# Patient Record
Sex: MALE | Race: OTHER | HISPANIC OR LATINO
[De-identification: names, ages, dates, MRNs, and addresses within clinical notes are randomized per-mention and may not be internally consistent; named-entity substitution may affect disease eponyms.]

---

## 2021-01-07 PROBLEM — Z00.00 ENCOUNTER FOR PREVENTIVE HEALTH EXAMINATION: Status: ACTIVE | Noted: 2021-01-07

## 2021-01-11 ENCOUNTER — APPOINTMENT (OUTPATIENT)
Dept: PHYSICAL MEDICINE AND REHAB | Facility: CLINIC | Age: 46
End: 2021-01-11

## 2021-01-19 ENCOUNTER — APPOINTMENT (OUTPATIENT)
Dept: PHYSICAL MEDICINE AND REHAB | Facility: CLINIC | Age: 46
End: 2021-01-19
Payer: COMMERCIAL

## 2021-01-19 VITALS
HEIGHT: 71 IN | WEIGHT: 198 LBS | HEART RATE: 68 BPM | BODY MASS INDEX: 27.72 KG/M2 | TEMPERATURE: 97.3 F | DIASTOLIC BLOOD PRESSURE: 83 MMHG | SYSTOLIC BLOOD PRESSURE: 135 MMHG | RESPIRATION RATE: 17 BRPM

## 2021-01-19 DIAGNOSIS — R20.0 ANESTHESIA OF SKIN: ICD-10-CM

## 2021-01-19 DIAGNOSIS — M79.641 PAIN IN RIGHT HAND: ICD-10-CM

## 2021-01-19 DIAGNOSIS — Z82.61 FAMILY HISTORY OF ARTHRITIS: ICD-10-CM

## 2021-01-19 DIAGNOSIS — G89.29 PAIN IN RIGHT HAND: ICD-10-CM

## 2021-01-19 PROCEDURE — 99072 ADDL SUPL MATRL&STAF TM PHE: CPT

## 2021-01-19 PROCEDURE — 99204 OFFICE O/P NEW MOD 45 MIN: CPT

## 2021-01-19 RX ORDER — MELOXICAM 15 MG/1
15 TABLET ORAL
Qty: 30 | Refills: 0 | Status: ACTIVE | COMMUNITY
Start: 2020-12-02

## 2021-01-19 NOTE — REVIEW OF SYSTEMS
[Patient Intake Form Reviewed] : Patient intake form was reviewed [Joint Pain] : joint pain [Muscle Pain] : muscle pain [Muscle Weakness] : muscle weakness [Difficulty Walking] : no difficulty walking [Negative] : Neurological [de-identified] : numbness in right hand

## 2021-01-19 NOTE — HISTORY OF PRESENT ILLNESS
[FreeTextEntry1] : Mr. FILIPPO BURNETT is a very pleasant 45 year male who seen for evaluation of right hand numbness and tingling  that has been ongoing for a year (progressively worse over the last 5-6 months) without any specific injury or inciting event. The pain is located primarily in the right thumb and in the web space between all the fingers. Reports that the numbness is painful and describes the pain as "throbbing numbness". Reports that the numbness in the thumb is constant and it is intermittent in nature between the fingers.. The pain is rated as 5/10 during today's visit, and ranges from 5-9/10. The patient's symptoms are aggravated by using his phone, using a drill at work and at night. Reports that it is alleviated by rest and heat. Reports associated weakness in the right hand. The patient denies any bowel/bladder dysfunction. He has not tried any medications, injections or had imaging of the hand. The patient has no other complaints at this time. He is right handed and works as a . \par

## 2021-01-19 NOTE — PHYSICAL EXAM
[Normal] : Oriented to person, place, and time, insight and judgement were intact and the affect was normal [de-identified] : Normal gait, no clubbing or cyanosis of the fingernails, no joint swelling seen. Muscle strength normal except mild weakness in right ABP. Positive Tinel's on the right. Negative compression or Phalen's test [de-identified] : Decreased sensation to light touch in right thumb and in web space between fingers. Negative Bolivar's. DTR symmetric and intact.

## 2021-01-19 NOTE — ASSESSMENT
[FreeTextEntry1] : Mr. FILIPPO BURNETT is a very pleasant 45 year male who seen for evaluation of right hand numbness and tingling. Based on clinical history and exam, may be due to peripheral nerve entrapment. \par -Recommend EMG/NCS to evaluate for upper extremity peripheral nerve entrapment \par -Continue with conservative care\par -Follow up for EMG/NCS testing \par \par Information given to patient about EMG and Nerve Conduction Study Examination including  planning, differential diagnosis to rule in /rule out ,duration of the test ,precautions (if patient on blood thinner.has bleeding disorder or  pace maker device etc -still possible to undergo with care), side effects(benign-limited to short term bruising and discomfort/pain)  \par The protocol of temp checks upon arrival ,disinfection procedure of waiting room and the lab explained- reassured. \par All questions answered. \par Patient instructed to book appointment upon conclusion of appointment\par \par Information sheet ' Answers to your Questions on EMG " forwarded to patient to read prior to testing, with further information about training,background and the procedure itself .\par  I certify that > 50 % of time spent was spent on counselling and coordination of care and more than 50% face to face directly \par Time spent including review of documents /records/decision making /discussion  amounted  > 45 minutes\par

## 2021-01-25 ENCOUNTER — APPOINTMENT (OUTPATIENT)
Dept: PHYSICAL MEDICINE AND REHAB | Facility: CLINIC | Age: 46
End: 2021-01-25
Payer: COMMERCIAL

## 2021-01-25 VITALS
TEMPERATURE: 97.6 F | HEART RATE: 73 BPM | BODY MASS INDEX: 27.72 KG/M2 | HEIGHT: 71 IN | WEIGHT: 198 LBS | SYSTOLIC BLOOD PRESSURE: 141 MMHG | RESPIRATION RATE: 18 BRPM | DIASTOLIC BLOOD PRESSURE: 86 MMHG

## 2021-01-25 DIAGNOSIS — G56.03 CARPAL TUNNEL SYNDROM,BILATERAL UPPER LIMBS: ICD-10-CM

## 2021-01-25 PROCEDURE — 95886 MUSC TEST DONE W/N TEST COMP: CPT

## 2021-01-25 PROCEDURE — 95911 NRV CNDJ TEST 9-10 STUDIES: CPT

## 2021-01-25 PROCEDURE — 99072 ADDL SUPL MATRL&STAF TM PHE: CPT
